# Patient Record
(demographics unavailable — no encounter records)

---

## 2024-12-03 NOTE — HEALTH RISK ASSESSMENT
[Good] : ~his/her~  mood as  good [None] : None [With Significant Other] : lives with significant other [Employed] : employed [] :  [Sexually Active] : sexually active [Feels Safe at Home] : Feels safe at home [Fully functional (bathing, dressing, toileting, transferring, walking, feeding)] : Fully functional (bathing, dressing, toileting, transferring, walking, feeding) [Fully functional (using the telephone, shopping, preparing meals, housekeeping, doing laundry, using] : Fully functional and needs no help or supervision to perform IADLs (using the telephone, shopping, preparing meals, housekeeping, doing laundry, using transportation, managing medications and managing finances) [Reports normal functional visual acuity (ie: able to read med bottle)] : Reports normal functional visual acuity [Smoke Detector] : smoke detector [Carbon Monoxide Detector] : carbon monoxide detector [Safety elements used in home] : safety elements used in home [Seat Belt] :  uses seat belt [Sunscreen] : uses sunscreen [Yes] : Yes [2 - 3 times a week (3 pts)] : 2 - 3  times a week (3 points) [1 or 2 (0 pts)] : 1 or 2 (0 points) [Never (0 pts)] : Never (0 points) [No] : In the past 12 months have you used drugs other than those required for medical reasons? No [No falls in past year] : Patient reported no falls in the past year [0] : 2) Feeling down, depressed, or hopeless: Not at all (0) [PHQ-2 Negative - No further assessment needed] : PHQ-2 Negative - No further assessment needed [Never] : Never [Audit-CScore] : 3 [de-identified] : walks regularly [de-identified] : mediterranean diet [[id number]] : 0 [Change in mental status noted] : No change in mental status noted [High Risk Behavior] : no high risk behavior [Reports changes in hearing] : Reports no changes in hearing [Reports changes in vision] : Reports no changes in vision [Reports changes in dental health] : Reports no changes in dental health [Travel to Developing Areas] : does not  travel to developing areas [TB Exposure] : is not being exposed to tuberculosis [Caregiver Concerns] : does not have caregiver concerns [ColonoscopyDate] : 10/19 [FreeTextEntry2] : teacher

## 2024-12-03 NOTE — REVIEW OF SYSTEMS
[Poor Libido] : poor libido [Negative] : Heme/Lymph [Dysuria] : no dysuria [Incontinence] : no incontinence [Hesitancy] : no hesitancy [Nocturia] : no nocturia [Hematuria] : no hematuria [Frequency] : no frequency [Impotence] : no impotency

## 2024-12-03 NOTE — PLAN
[FreeTextEntry1] : Asthma: - Well controlled without use of rescue inhaler - Recommend patient keep rescue inhaler in the house in case of exacerbation - Advised patient to notify the office for wheezing/asthma exacerbation  Snoring: - Possibly TYSHAWN given snoring, non-restorative sleep, and nighttime awakenings - Home sleep study ordered today  Low libido: - Total testosterone and serum prolactin ordered today - Advised patient to have blood drawn between 8-10am - Discussed etiologies of low libido including low testosterone, psychosocial stressors, etc. Will rule out pathophysiologic cause first with labs. If normal, will discuss options for treating low libido due to psychological stressors  HCM: - Patient did not fast today. Script provided to patient to have fasting blood work done at outside lab. - Hep C screening offered and accepted - EKG SB, RSR(V1), negative precordial T waves. Likely due to lead placement as not present in contiguous leads. No chest pain. Will obtain previous EKGs from old PCPs for comparison as none available in system. - Next colonoscopy- 10/2029 - Advised patient to make appt for eye exam  - Next skin cancer screening- will make appt, last seen 6 months ago - Flu vaccine- 10/24 - Advised patient to receive Covid vaccine from local pharmacy. - Discussed availability of shingles vaccine for herpes zoster. Discussed risks associated with herpes zoster including postherpetic neuralgia, herpes ophthalmicus, and visceral dissemination of herpes zoster, which is a life-threatening emergency. Advised patient to get vaccinated at local pharmacy to avoid these complications.

## 2024-12-03 NOTE — HISTORY OF PRESENT ILLNESS
[FreeTextEntry1] : NPA/CPE [de-identified] : Patient is a 56-year-old male with PMH asthma presenting for a CPE. Patient's asthma was worse as a child and reports it improved as he got older. He keeps a rescue inhaler in case of exacerbation, but he has not had to use it recently. Patient endorses trouble with sleep maintenance. He reports he wakes up 4-5 times per night. He usually does not wake up feeling refreshed. He is unsure if he snores, but he believes that he does. Patient also reports a low libido. He does not endorse erectile dysfunction, but states "it's not like it used to be." He does not endorse difficulty attaining an erection. He is requesting testosterone testing.Patient works as an . He is . He enjoys fishing and playing guitar and bagpipes in a band in his spare time.  Last colonoscopy- 10/2019 f/u 10 years Last eye exam- 2023 (wears readers) Last skin cancer screening- every 6 months, will make upcoming appt Flu vaccine- 10/24 Covid vaccine- not yet this season Shingles vaccine- never

## 2024-12-03 NOTE — HISTORY OF PRESENT ILLNESS
[FreeTextEntry1] : NPA/CPE [de-identified] : Patient is a 56-year-old male with PMH asthma presenting for a CPE. Patient's asthma was worse as a child and reports it improved as he got older. He keeps a rescue inhaler in case of exacerbation, but he has not had to use it recently. Patient endorses trouble with sleep maintenance. He reports he wakes up 4-5 times per night. He usually does not wake up feeling refreshed. He is unsure if he snores, but he believes that he does. Patient also reports a low libido. He does not endorse erectile dysfunction, but states "it's not like it used to be." He does not endorse difficulty attaining an erection. He is requesting testosterone testing.Patient works as an . He is . He enjoys fishing and playing guitar and bagpipes in a band in his spare time.  Last colonoscopy- 10/2019 f/u 10 years Last eye exam- 2023 (wears readers) Last skin cancer screening- every 6 months, will make upcoming appt Flu vaccine- 10/24 Covid vaccine- not yet this season Shingles vaccine- never

## 2024-12-03 NOTE — HEALTH RISK ASSESSMENT
[Good] : ~his/her~  mood as  good [None] : None [With Significant Other] : lives with significant other [Employed] : employed [] :  [Sexually Active] : sexually active [Feels Safe at Home] : Feels safe at home [Fully functional (bathing, dressing, toileting, transferring, walking, feeding)] : Fully functional (bathing, dressing, toileting, transferring, walking, feeding) [Fully functional (using the telephone, shopping, preparing meals, housekeeping, doing laundry, using] : Fully functional and needs no help or supervision to perform IADLs (using the telephone, shopping, preparing meals, housekeeping, doing laundry, using transportation, managing medications and managing finances) [Reports normal functional visual acuity (ie: able to read med bottle)] : Reports normal functional visual acuity [Smoke Detector] : smoke detector [Carbon Monoxide Detector] : carbon monoxide detector [Safety elements used in home] : safety elements used in home [Seat Belt] :  uses seat belt [Sunscreen] : uses sunscreen [Yes] : Yes [2 - 3 times a week (3 pts)] : 2 - 3  times a week (3 points) [1 or 2 (0 pts)] : 1 or 2 (0 points) [Never (0 pts)] : Never (0 points) [No] : In the past 12 months have you used drugs other than those required for medical reasons? No [No falls in past year] : Patient reported no falls in the past year [0] : 2) Feeling down, depressed, or hopeless: Not at all (0) [PHQ-2 Negative - No further assessment needed] : PHQ-2 Negative - No further assessment needed [Never] : Never [Audit-CScore] : 3 [de-identified] : walks regularly [de-identified] : mediterranean diet [[id number]] : 0 [Change in mental status noted] : No change in mental status noted [High Risk Behavior] : no high risk behavior [Reports changes in hearing] : Reports no changes in hearing [Reports changes in vision] : Reports no changes in vision [Reports changes in dental health] : Reports no changes in dental health [Travel to Developing Areas] : does not  travel to developing areas [TB Exposure] : is not being exposed to tuberculosis [Caregiver Concerns] : does not have caregiver concerns [ColonoscopyDate] : 10/19 [FreeTextEntry2] : teacher

## 2025-06-25 NOTE — REVIEW OF SYSTEMS
[Abdominal Pain] : no abdominal pain [Nausea] : no nausea [Constipation] : no constipation [Diarrhea] : no diarrhea [Vomiting] : no vomiting [Heartburn] : no heartburn [Melena] : no melena [Negative] : Heme/Lymph [FreeTextEntry7] : rectal pain

## 2025-06-25 NOTE — HISTORY OF PRESENT ILLNESS
[FreeTextEntry8] : Patient is a 56-year-old male with PMH asthma, HLD, internal hemorrhoids presenting for worsening hemorrhoid symptoms. Patient had screening colonoscopy in 2019 that showed internal hemorrhoids. Patient reports he has been experiencing rectal pain for about 1 week. He reports some itching, but denies bleeding. He has been using OTC hemorrhoid cream without relief. He is requesting something stronger to help with his discomfort. He reports he is having regular, soft BMs and is not straining. He has been doing more core exercises lately.

## 2025-06-25 NOTE — PLAN
[FreeTextEntry1] : Hemorrhoid: - Exam c/w external hemorrhoid - Discussed cream vs suppository, will prescribe cream as he has an external hemorrhoid - Rx hydrocortisone-pramoxine 1-1% cream sent to patient's pharmacy - Can consider increasing dose if prescribed dose is ineffective - Recommend frozen witch hazel pads to area with barrier to protect skin - Stool softener if needed for constipation - Do not sit on toilet if unable to have BM  f/u if no improvement

## 2025-06-25 NOTE — PHYSICAL EXAM
[No Acute Distress] : no acute distress [Well Nourished] : well nourished [Well Developed] : well developed [Well-Appearing] : well-appearing [Normal Sclera/Conjunctiva] : normal sclera/conjunctiva [PERRL] : pupils equal round and reactive to light [EOMI] : extraocular movements intact [Normal Outer Ear/Nose] : the outer ears and nose were normal in appearance [Normal Oropharynx] : the oropharynx was normal [No JVD] : no jugular venous distention [No Lymphadenopathy] : no lymphadenopathy [Supple] : supple [Thyroid Normal, No Nodules] : the thyroid was normal and there were no nodules present [No Respiratory Distress] : no respiratory distress  [No Accessory Muscle Use] : no accessory muscle use [Clear to Auscultation] : lungs were clear to auscultation bilaterally [Normal Rate] : normal rate  [Regular Rhythm] : with a regular rhythm [Normal S1, S2] : normal S1 and S2 [No Murmur] : no murmur heard [No Carotid Bruits] : no carotid bruits [No Abdominal Bruit] : a ~M bruit was not heard ~T in the abdomen [No Varicosities] : no varicosities [Pedal Pulses Present] : the pedal pulses are present [No Edema] : there was no peripheral edema [No Palpable Aorta] : no palpable aorta [No Extremity Clubbing/Cyanosis] : no extremity clubbing/cyanosis [Soft] : abdomen soft [Non Tender] : non-tender [Non-distended] : non-distended [No Masses] : no abdominal mass palpated [No HSM] : no HSM [Normal Bowel Sounds] : normal bowel sounds [Normal Posterior Cervical Nodes] : no posterior cervical lymphadenopathy [Normal Anterior Cervical Nodes] : no anterior cervical lymphadenopathy [No CVA Tenderness] : no CVA  tenderness [No Spinal Tenderness] : no spinal tenderness [No Joint Swelling] : no joint swelling [Grossly Normal Strength/Tone] : grossly normal strength/tone [No Rash] : no rash [Coordination Grossly Intact] : coordination grossly intact [No Focal Deficits] : no focal deficits [Normal Gait] : normal gait [Deep Tendon Reflexes (DTR)] : deep tendon reflexes were 2+ and symmetric [Normal Affect] : the affect was normal [Normal Insight/Judgement] : insight and judgment were intact [FreeTextEntry1] : offered chaperone, patient declined. Rectal exam showed external hemorrhoid that is erythematous and swollen

## 2025-07-17 NOTE — PLAN
[FreeTextEntry1] : Dysuria: - POCT UA negative - UA and urine culture, as well as PSA, collected and sent today - Will defer antibiotics until culture results given negative POCT UA and improvement of symptoms with increased water intake - Recommend increasing water intake - Notify office for worsening symptoms including fever, chills, N/V, flank pain  f/u PRN

## 2025-07-17 NOTE — HISTORY OF PRESENT ILLNESS
[FreeTextEntry8] : Patient is a 56-year-old male with PMH asthma, HLD, internal hemorrhoids presenting for dysuria. The patient reports experiencing mild urinary discomfort for over a week. He describes a very mild burning sensation that is noticeable but not overwhelming or uncomfortable. He also mentions feeling the need to urinate again a few minutes after voiding, although he is able to produce a full stream when he does. He notes that his urinary stream may not be as strong as it used to be. The patient denies any blood in the urine, cloudy urine, or foul-smelling urine. He also denies any fevers, chills, flank pain, nausea, or vomiting. He reports decreased water intake recently, but reports increasing his fluid consumption in the last 24 hours, which has led to some improvement in his symptoms. He denies any penile discharge or new sexual partners. He expresses concern about prostate cancer due to his age and knowing others with the condition. His hemorrhoids, which were previously an issue, have improved significantly.

## 2025-07-17 NOTE — REVIEW OF SYSTEMS
[Dysuria] : dysuria [Frequency] : frequency [Negative] : Heme/Lymph [Incontinence] : no incontinence [Hesitancy] : no hesitancy [Nocturia] : no nocturia [Hematuria] : no hematuria [Impotence] : no impotency [Poor Libido] : libido not poor

## 2025-07-28 NOTE — PLAN
[FreeTextEntry1] : Possible tick bite: - Given presence of possible tick for > 36 hours and within 72 hours since the possible tick removal, will treat with antibiotic prophylaxis. Rx doxycycline 200mg PO x 1 dose ordered today. - Recommend patient monitor himself for erythema migrans rash. Patient shown a picture of erythema migrans for reference. Advised patient to schedule appt immediately if he discovers the rash. - Recommend f/u in 6 weeks for serologic testing

## 2025-07-28 NOTE — HISTORY OF PRESENT ILLNESS
[FreeTextEntry8] : Patient is a 56-year-old male with PMH asthma, HLD, internal hemorrhoids presenting for possible tick bite. Patient reports that last Tuesday, he was doing yard work. He noted some discomfort between his 3rd and 4th toes on L foot. He loosely examined the area but did not see anything. For the next several days, he felt like there was something between his toes. On Friday, he scratched the area and felt something that felt like a scab come off and he then started bleeding. He did not see a tick attached to him, but states the scab may have been a tick.

## 2025-07-28 NOTE — PHYSICAL EXAM
[No Acute Distress] : no acute distress [Well Nourished] : well nourished [Well Developed] : well developed [Well-Appearing] : well-appearing [Normal Sclera/Conjunctiva] : normal sclera/conjunctiva [PERRL] : pupils equal round and reactive to light [EOMI] : extraocular movements intact [Normal Outer Ear/Nose] : the outer ears and nose were normal in appearance [Normal Oropharynx] : the oropharynx was normal [No JVD] : no jugular venous distention [No Lymphadenopathy] : no lymphadenopathy [Supple] : supple [Thyroid Normal, No Nodules] : the thyroid was normal and there were no nodules present [No Respiratory Distress] : no respiratory distress  [No Accessory Muscle Use] : no accessory muscle use [Clear to Auscultation] : lungs were clear to auscultation bilaterally [Normal Rate] : normal rate  [Regular Rhythm] : with a regular rhythm [Normal S1, S2] : normal S1 and S2 [No Murmur] : no murmur heard [No Carotid Bruits] : no carotid bruits [No Abdominal Bruit] : a ~M bruit was not heard ~T in the abdomen [No Varicosities] : no varicosities [Pedal Pulses Present] : the pedal pulses are present [No Edema] : there was no peripheral edema [No Palpable Aorta] : no palpable aorta [No Extremity Clubbing/Cyanosis] : no extremity clubbing/cyanosis [Soft] : abdomen soft [Non Tender] : non-tender [Non-distended] : non-distended [No Masses] : no abdominal mass palpated [No HSM] : no HSM [Normal Bowel Sounds] : normal bowel sounds [Normal Posterior Cervical Nodes] : no posterior cervical lymphadenopathy [Normal Anterior Cervical Nodes] : no anterior cervical lymphadenopathy [No CVA Tenderness] : no CVA  tenderness [No Spinal Tenderness] : no spinal tenderness [No Joint Swelling] : no joint swelling [Grossly Normal Strength/Tone] : grossly normal strength/tone [No Rash] : no rash [Coordination Grossly Intact] : coordination grossly intact [No Focal Deficits] : no focal deficits [Normal Gait] : normal gait [Deep Tendon Reflexes (DTR)] : deep tendon reflexes were 2+ and symmetric [Normal Affect] : the affect was normal [Normal Insight/Judgement] : insight and judgment were intact [de-identified] : medial aspect of 4th left toe with some scaling and mild erythema on anterior aspect of base of the 4th toe. No erythema migrans rash noted

## 2025-07-29 NOTE — ASSESSMENT
[FreeTextEntry1] : Plan -Prostate MRI for planning of fusion biopsy -two week follow up to review results

## 2025-07-29 NOTE — HISTORY OF PRESENT ILLNESS
[FreeTextEntry1] : 55 y/o M for initial urological evaluation for prostate  Asthma, Gout, HLD, Snoring (negative for sleep apnea) Appendectomy Father-Prostate Cancer, Maternal grandfather-Leukemia  Never smoker  Possible allergy to sulfa antibiotic  Pt c/o 3 weeks of discomfort at the end of urination, mostly tip of penis. No rash, no discharge, no hematuria. Was evaluated by PCP who suspected sensation was related to dehydration. Symptoms resolved with increase in water intake only.   Denies hematuria, retention, straining, post void dribble, ED Has good stream  Nocturia 0-1, early morning, not bothersome   Reviewed UA: Neg blood, protein, leukocytes Reviewed Urine culture (July '25)-no growth  Reviewed PSA 2.90, 13% Free (July '25) PSA 2.33, 14% Free (Dec '24) PSA 1.66 (2021) PSA 1.37 (2020)  Discussed benefit of Prostate MRI for further evaluation in patient with family history of prostate cancer and lower free PSA  Pt agreed with plan   Plan -Prostate MRI for planning of fusion biopsy -two week follow up to review results

## 2025-07-29 NOTE — HISTORY OF PRESENT ILLNESS
[FreeTextEntry1] : 57 y/o M for initial urological evaluation for prostate  Asthma, Gout, HLD, Snoring (negative for sleep apnea) Appendectomy Father-Prostate Cancer, Maternal grandfather-Leukemia  Never smoker  Possible allergy to sulfa antibiotic  Pt c/o 3 weeks of discomfort at the end of urination, mostly tip of penis. No rash, no discharge, no hematuria. Was evaluated by PCP who suspected sensation was related to dehydration. Symptoms resolved with increase in water intake only.   Denies hematuria, retention, straining, post void dribble, ED Has good stream  Nocturia 0-1, early morning, not bothersome   Reviewed UA: Neg blood, protein, leukocytes Reviewed Urine culture (July '25)-no growth  Reviewed PSA 2.90, 13% Free (July '25) PSA 2.33, 14% Free (Dec '24) PSA 1.66 (2021) PSA 1.37 (2020)  Discussed benefit of Prostate MRI for further evaluation in patient with family history of prostate cancer and lower free PSA  Pt agreed with plan   Plan -Prostate MRI for planning of fusion biopsy -two week follow up to review results